# Patient Record
Sex: MALE | Race: BLACK OR AFRICAN AMERICAN | NOT HISPANIC OR LATINO | URBAN - METROPOLITAN AREA
[De-identification: names, ages, dates, MRNs, and addresses within clinical notes are randomized per-mention and may not be internally consistent; named-entity substitution may affect disease eponyms.]

---
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
## 2022-05-02 ENCOUNTER — HOSPITAL ENCOUNTER (EMERGENCY)
Facility: HOSPITAL | Age: 22
Discharge: HOME | End: 2022-05-02
Attending: EMERGENCY MEDICINE
Payer: MEDICAID

## 2022-05-02 ENCOUNTER — HOSPITAL ENCOUNTER (EMERGENCY)
Facility: HOSPITAL | Age: 22
Discharge: HOME | End: 2022-05-02
Attending: INTERNAL MEDICINE
Payer: MEDICAID

## 2022-05-02 VITALS
RESPIRATION RATE: 16 BRPM | WEIGHT: 300 LBS | SYSTOLIC BLOOD PRESSURE: 154 MMHG | TEMPERATURE: 100 F | HEART RATE: 108 BPM | HEIGHT: 69 IN | DIASTOLIC BLOOD PRESSURE: 66 MMHG | BODY MASS INDEX: 44.43 KG/M2 | OXYGEN SATURATION: 95 %

## 2022-05-02 VITALS
BODY MASS INDEX: 44.43 KG/M2 | RESPIRATION RATE: 18 BRPM | HEART RATE: 106 BPM | DIASTOLIC BLOOD PRESSURE: 73 MMHG | HEIGHT: 69 IN | TEMPERATURE: 98 F | SYSTOLIC BLOOD PRESSURE: 141 MMHG | WEIGHT: 300 LBS | OXYGEN SATURATION: 94 %

## 2022-05-02 DIAGNOSIS — R06.02 SHORTNESS OF BREATH: ICD-10-CM

## 2022-05-02 DIAGNOSIS — R56.9 SEIZURE: ICD-10-CM

## 2022-05-02 DIAGNOSIS — J18.9 PNEUMONIA OF RIGHT LOWER LOBE DUE TO INFECTIOUS ORGANISM: Primary | ICD-10-CM

## 2022-05-02 LAB
ALBUMIN SERPL BCP-MCNC: 4 G/DL (ref 3.5–5.2)
ALBUMIN SERPL BCP-MCNC: 4.1 G/DL (ref 3.5–5.2)
ALP SERPL-CCNC: 58 U/L (ref 55–135)
ALP SERPL-CCNC: 58 U/L (ref 55–135)
ALT SERPL W/O P-5'-P-CCNC: 21 U/L (ref 10–44)
ALT SERPL W/O P-5'-P-CCNC: 25 U/L (ref 10–44)
ANION GAP SERPL CALC-SCNC: 11 MMOL/L (ref 8–16)
ANION GAP SERPL CALC-SCNC: 9 MMOL/L (ref 8–16)
AST SERPL-CCNC: 27 U/L (ref 10–40)
AST SERPL-CCNC: 29 U/L (ref 10–40)
BACTERIA #/AREA URNS HPF: NORMAL /HPF
BASOPHILS # BLD AUTO: 0.02 K/UL (ref 0–0.2)
BASOPHILS # BLD AUTO: 0.03 K/UL (ref 0–0.2)
BASOPHILS NFR BLD: 0.2 % (ref 0–1.9)
BASOPHILS NFR BLD: 0.3 % (ref 0–1.9)
BILIRUB SERPL-MCNC: 0.5 MG/DL (ref 0.1–1)
BILIRUB SERPL-MCNC: 0.6 MG/DL (ref 0.1–1)
BILIRUB UR QL STRIP: NEGATIVE
BUN SERPL-MCNC: 8 MG/DL (ref 6–20)
BUN SERPL-MCNC: 8 MG/DL (ref 6–20)
CALCIUM SERPL-MCNC: 9 MG/DL (ref 8.7–10.5)
CALCIUM SERPL-MCNC: 9 MG/DL (ref 8.7–10.5)
CHLORIDE SERPL-SCNC: 103 MMOL/L (ref 95–110)
CHLORIDE SERPL-SCNC: 103 MMOL/L (ref 95–110)
CLARITY UR: CLEAR
CO2 SERPL-SCNC: 24 MMOL/L (ref 23–29)
CO2 SERPL-SCNC: 24 MMOL/L (ref 23–29)
COLOR UR: YELLOW
CREAT SERPL-MCNC: 0.9 MG/DL (ref 0.5–1.4)
CREAT SERPL-MCNC: 0.9 MG/DL (ref 0.5–1.4)
CTP QC/QA: YES
CTP QC/QA: YES
DIFFERENTIAL METHOD: ABNORMAL
DIFFERENTIAL METHOD: ABNORMAL
EOSINOPHIL # BLD AUTO: 0 K/UL (ref 0–0.5)
EOSINOPHIL # BLD AUTO: 0.1 K/UL (ref 0–0.5)
EOSINOPHIL NFR BLD: 0.3 % (ref 0–8)
EOSINOPHIL NFR BLD: 0.7 % (ref 0–8)
ERYTHROCYTE [DISTWIDTH] IN BLOOD BY AUTOMATED COUNT: 13.7 % (ref 11.5–14.5)
ERYTHROCYTE [DISTWIDTH] IN BLOOD BY AUTOMATED COUNT: 13.8 % (ref 11.5–14.5)
EST. GFR  (AFRICAN AMERICAN): >60 ML/MIN/1.73 M^2
EST. GFR  (AFRICAN AMERICAN): >60 ML/MIN/1.73 M^2
EST. GFR  (NON AFRICAN AMERICAN): >60 ML/MIN/1.73 M^2
EST. GFR  (NON AFRICAN AMERICAN): >60 ML/MIN/1.73 M^2
GLUCOSE SERPL-MCNC: 120 MG/DL (ref 70–110)
GLUCOSE SERPL-MCNC: 139 MG/DL (ref 70–110)
GLUCOSE UR QL STRIP: NEGATIVE
HCT VFR BLD AUTO: 42 % (ref 40–54)
HCT VFR BLD AUTO: 45.8 % (ref 40–54)
HGB BLD-MCNC: 13.6 G/DL (ref 14–18)
HGB BLD-MCNC: 14.2 G/DL (ref 14–18)
HGB UR QL STRIP: NEGATIVE
HYALINE CASTS #/AREA URNS LPF: 0 /LPF
IMM GRANULOCYTES # BLD AUTO: 0.02 K/UL (ref 0–0.04)
IMM GRANULOCYTES # BLD AUTO: 0.04 K/UL (ref 0–0.04)
IMM GRANULOCYTES NFR BLD AUTO: 0.2 % (ref 0–0.5)
IMM GRANULOCYTES NFR BLD AUTO: 0.3 % (ref 0–0.5)
KETONES UR QL STRIP: NEGATIVE
LACTATE SERPL-SCNC: 1.7 MMOL/L (ref 0.5–2.2)
LEUKOCYTE ESTERASE UR QL STRIP: NEGATIVE
LYMPHOCYTES # BLD AUTO: 0.8 K/UL (ref 1–4.8)
LYMPHOCYTES # BLD AUTO: 1.3 K/UL (ref 1–4.8)
LYMPHOCYTES NFR BLD: 12.9 % (ref 18–48)
LYMPHOCYTES NFR BLD: 6.3 % (ref 18–48)
MCH RBC QN AUTO: 26.5 PG (ref 27–31)
MCH RBC QN AUTO: 26.9 PG (ref 27–31)
MCHC RBC AUTO-ENTMCNC: 31 G/DL (ref 32–36)
MCHC RBC AUTO-ENTMCNC: 32.4 G/DL (ref 32–36)
MCV RBC AUTO: 83 FL (ref 82–98)
MCV RBC AUTO: 86 FL (ref 82–98)
MICROSCOPIC COMMENT: NORMAL
MONOCYTES # BLD AUTO: 0.4 K/UL (ref 0.3–1)
MONOCYTES # BLD AUTO: 0.5 K/UL (ref 0.3–1)
MONOCYTES NFR BLD: 3.7 % (ref 4–15)
MONOCYTES NFR BLD: 4.3 % (ref 4–15)
NEUTROPHILS # BLD AUTO: 10.5 K/UL (ref 1.8–7.7)
NEUTROPHILS # BLD AUTO: 8.6 K/UL (ref 1.8–7.7)
NEUTROPHILS NFR BLD: 82.2 % (ref 38–73)
NEUTROPHILS NFR BLD: 88.6 % (ref 38–73)
NITRITE UR QL STRIP: NEGATIVE
NRBC BLD-RTO: 0 /100 WBC
NRBC BLD-RTO: 0 /100 WBC
PH UR STRIP: 6 [PH] (ref 5–8)
PLATELET # BLD AUTO: 405 K/UL (ref 150–450)
PLATELET # BLD AUTO: 411 K/UL (ref 150–450)
PMV BLD AUTO: 9.6 FL (ref 9.2–12.9)
PMV BLD AUTO: 9.7 FL (ref 9.2–12.9)
POC MOLECULAR INFLUENZA A AGN: NEGATIVE
POC MOLECULAR INFLUENZA B AGN: NEGATIVE
POTASSIUM SERPL-SCNC: 4.2 MMOL/L (ref 3.5–5.1)
POTASSIUM SERPL-SCNC: 4.4 MMOL/L (ref 3.5–5.1)
PROT SERPL-MCNC: 7.7 G/DL (ref 6–8.4)
PROT SERPL-MCNC: 7.8 G/DL (ref 6–8.4)
PROT UR QL STRIP: ABNORMAL
RBC # BLD AUTO: 5.06 M/UL (ref 4.6–6.2)
RBC # BLD AUTO: 5.35 M/UL (ref 4.6–6.2)
RBC #/AREA URNS HPF: 0 /HPF (ref 0–4)
SARS-COV-2 RDRP RESP QL NAA+PROBE: NEGATIVE
SODIUM SERPL-SCNC: 136 MMOL/L (ref 136–145)
SODIUM SERPL-SCNC: 138 MMOL/L (ref 136–145)
SP GR UR STRIP: 1.02 (ref 1–1.03)
URN SPEC COLLECT METH UR: ABNORMAL
UROBILINOGEN UR STRIP-ACNC: NEGATIVE EU/DL
WBC # BLD AUTO: 10.39 K/UL (ref 3.9–12.7)
WBC # BLD AUTO: 11.82 K/UL (ref 3.9–12.7)
WBC #/AREA URNS HPF: 0 /HPF (ref 0–5)

## 2022-05-02 PROCEDURE — 25000003 PHARM REV CODE 250: Performed by: EMERGENCY MEDICINE

## 2022-05-02 PROCEDURE — 93005 ELECTROCARDIOGRAM TRACING: CPT

## 2022-05-02 PROCEDURE — 93010 ELECTROCARDIOGRAM REPORT: CPT | Mod: 59,76,, | Performed by: INTERNAL MEDICINE

## 2022-05-02 PROCEDURE — 99285 EMERGENCY DEPT VISIT HI MDM: CPT | Mod: 25,27

## 2022-05-02 PROCEDURE — 85025 COMPLETE CBC W/AUTO DIFF WBC: CPT | Performed by: INTERNAL MEDICINE

## 2022-05-02 PROCEDURE — 93010 ELECTROCARDIOGRAM REPORT: CPT | Mod: ,,, | Performed by: INTERNAL MEDICINE

## 2022-05-02 PROCEDURE — 93010 EKG 12-LEAD: ICD-10-PCS | Mod: ,,, | Performed by: INTERNAL MEDICINE

## 2022-05-02 PROCEDURE — 96360 HYDRATION IV INFUSION INIT: CPT

## 2022-05-02 PROCEDURE — 80053 COMPREHEN METABOLIC PANEL: CPT | Mod: 91 | Performed by: EMERGENCY MEDICINE

## 2022-05-02 PROCEDURE — 80177 DRUG SCRN QUAN LEVETIRACETAM: CPT | Performed by: EMERGENCY MEDICINE

## 2022-05-02 PROCEDURE — 25000003 PHARM REV CODE 250: Performed by: INTERNAL MEDICINE

## 2022-05-02 PROCEDURE — 80053 COMPREHEN METABOLIC PANEL: CPT | Performed by: INTERNAL MEDICINE

## 2022-05-02 PROCEDURE — 87502 INFLUENZA DNA AMP PROBE: CPT

## 2022-05-02 PROCEDURE — 87040 BLOOD CULTURE FOR BACTERIA: CPT | Performed by: INTERNAL MEDICINE

## 2022-05-02 PROCEDURE — 25000003 PHARM REV CODE 250

## 2022-05-02 PROCEDURE — 85025 COMPLETE CBC W/AUTO DIFF WBC: CPT | Mod: 91 | Performed by: EMERGENCY MEDICINE

## 2022-05-02 PROCEDURE — 99284 EMERGENCY DEPT VISIT MOD MDM: CPT | Mod: 25

## 2022-05-02 PROCEDURE — U0002 COVID-19 LAB TEST NON-CDC: HCPCS | Performed by: INTERNAL MEDICINE

## 2022-05-02 PROCEDURE — 93010 EKG 12-LEAD: ICD-10-PCS | Mod: 59,76,, | Performed by: INTERNAL MEDICINE

## 2022-05-02 PROCEDURE — 83605 ASSAY OF LACTIC ACID: CPT | Performed by: INTERNAL MEDICINE

## 2022-05-02 PROCEDURE — 81000 URINALYSIS NONAUTO W/SCOPE: CPT | Performed by: INTERNAL MEDICINE

## 2022-05-02 PROCEDURE — 63600175 PHARM REV CODE 636 W HCPCS: Performed by: INTERNAL MEDICINE

## 2022-05-02 RX ORDER — LEVOFLOXACIN 750 MG/1
750 TABLET ORAL DAILY
Status: DISCONTINUED | OUTPATIENT
Start: 2022-05-02 | End: 2022-05-02 | Stop reason: HOSPADM

## 2022-05-02 RX ORDER — ACETAMINOPHEN 500 MG
1000 TABLET ORAL
Status: COMPLETED | OUTPATIENT
Start: 2022-05-02 | End: 2022-05-02

## 2022-05-02 RX ORDER — LEVOFLOXACIN 750 MG/1
750 TABLET ORAL DAILY
Qty: 4 TABLET | Refills: 0 | Status: ON HOLD | OUTPATIENT
Start: 2022-05-03 | End: 2022-05-24 | Stop reason: HOSPADM

## 2022-05-02 RX ORDER — LEVETIRACETAM 500 MG/1
2000 TABLET ORAL ONCE
Status: COMPLETED | OUTPATIENT
Start: 2022-05-02 | End: 2022-05-02

## 2022-05-02 RX ORDER — IBUPROFEN 400 MG/1
800 TABLET ORAL
Status: COMPLETED | OUTPATIENT
Start: 2022-05-02 | End: 2022-05-02

## 2022-05-02 RX ORDER — LEVOFLOXACIN 750 MG/1
750 TABLET ORAL DAILY
Status: DISCONTINUED | OUTPATIENT
Start: 2022-05-02 | End: 2022-05-02

## 2022-05-02 RX ADMIN — ACETAMINOPHEN 1000 MG: 500 TABLET ORAL at 05:05

## 2022-05-02 RX ADMIN — LEVETIRACETAM 2000 MG: 500 TABLET, FILM COATED ORAL at 09:05

## 2022-05-02 RX ADMIN — SODIUM CHLORIDE 1000 ML: 0.9 INJECTION, SOLUTION INTRAVENOUS at 09:05

## 2022-05-02 RX ADMIN — IBUPROFEN 800 MG: 400 TABLET ORAL at 05:05

## 2022-05-02 RX ADMIN — SODIUM CHLORIDE, SODIUM LACTATE, POTASSIUM CHLORIDE, AND CALCIUM CHLORIDE 1000 ML: .6; .31; .03; .02 INJECTION, SOLUTION INTRAVENOUS at 05:05

## 2022-05-02 RX ADMIN — LEVOFLOXACIN 750 MG: 750 TABLET, FILM COATED ORAL at 07:05

## 2022-05-02 NOTE — DISCHARGE INSTRUCTIONS
Thank you for coming to our Emergency Department today. It is important to remember that some problems are difficult to diagnose and may not be found during your first visit. Be sure to follow up with your primary care doctor and review any labs/imaging that was performed with them. If you do not have a primary care doctor, you may contact the one listed on your discharge paperwork or you may also call the [facility] Appointment Desk at [phone] to schedule an appointment with one.     All medications may potentially have side effects and it is impossible to predict which medications may give you side effects. If you feel that you are having a negative effect of any medication you should immediately stop taking them and seek medical attention.    Return to the ER with any questions/concerns, new/concerning symptoms, worsening or failure to improve. Do not drive or make any important decisions for 24 hours if you have received any pain medications, sedatives or mood altering drugs during your ER visit.

## 2022-05-02 NOTE — ED PROVIDER NOTES
Encounter Date: 5/2/2022       History     Chief Complaint   Patient presents with    Shortness of Breath     Pt. Arrived via EMS with 1 day Hx of SOB + cold and congestion,  pt. Is on 6lpm via N/C Spo2 98% initial R/A 84% -per EMS, Pt. Is speaking in complete sentences, denies N/V/D, oral temp 103.0.      22-year-old male presents to the emergency department complaining of shortness of breath, cold symptoms x1 day.  He states his grandmother has recently been ill with a respiratory virus and believes he may have caught it from her.  Denies chest pain/nausea/vomiting.    The history is provided by the patient. No  was used.     Review of patient's allergies indicates:   Allergen Reactions    Amoxicillin      Past Medical History:   Diagnosis Date    Asthma     Diabetes mellitus     Hypertension     Seizures      No past surgical history on file.  No family history on file.  Social History     Tobacco Use    Smoking status: Never Smoker    Smokeless tobacco: Never Used   Substance Use Topics    Alcohol use: No    Drug use: No     Review of Systems   Constitutional: Positive for fever.   HENT: Positive for congestion, postnasal drip and rhinorrhea.    Respiratory: Positive for cough and shortness of breath. Negative for wheezing.    Cardiovascular: Negative for chest pain, palpitations and leg swelling.   Gastrointestinal: Negative for nausea and vomiting.   All other systems reviewed and are negative.      Physical Exam     Initial Vitals [05/02/22 0505]   BP Pulse Resp Temp SpO2   (!) 169/87 (!) 123 (!) 22 (!) 103 °F (39.4 °C) 98 %      MAP       --         Physical Exam    Nursing note and vitals reviewed.  Constitutional: He is not diaphoretic. No distress.   HENT:   Head: Normocephalic and atraumatic.   Right Ear: External ear normal.   Left Ear: External ear normal.   Clear nasal discharge, clear postnasal drip, posterior oropharyngeal erythema without exudate or edema   Eyes:  Conjunctivae and EOM are normal. Pupils are equal, round, and reactive to light.   Neck: Neck supple.   Normal range of motion.  Cardiovascular: Normal rate, regular rhythm and intact distal pulses.   Pulmonary/Chest: Breath sounds normal. No respiratory distress. He has no wheezes.   Abdominal: Abdomen is soft. Bowel sounds are normal. He exhibits no distension.   Musculoskeletal:         General: Normal range of motion.      Cervical back: Normal range of motion and neck supple.     Neurological: He is alert and oriented to person, place, and time. He has normal strength.   Skin: Skin is warm and dry. Capillary refill takes less than 2 seconds.   Psychiatric: He has a normal mood and affect. Thought content normal.         ED Course   Procedures  Labs Reviewed   CBC W/ AUTO DIFFERENTIAL - Abnormal; Notable for the following components:       Result Value    MCH 26.5 (*)     MCHC 31.0 (*)     Gran # (ANC) 8.6 (*)     Gran % 82.2 (*)     Lymph % 12.9 (*)     Mono % 3.7 (*)     All other components within normal limits   COMPREHENSIVE METABOLIC PANEL - Abnormal; Notable for the following components:    Glucose 139 (*)     All other components within normal limits   URINALYSIS, REFLEX TO URINE CULTURE - Abnormal; Notable for the following components:    Protein, UA 1+ (*)     All other components within normal limits    Narrative:     Specimen Source->Urine   POCT INFLUENZA A/B MOLECULAR - Normal   CULTURE, BLOOD   CULTURE, BLOOD   LACTIC ACID, PLASMA   URINALYSIS MICROSCOPIC    Narrative:     Specimen Source->Urine   SARS-COV-2 RDRP GENE          Imaging Results          X-Ray Chest AP Portable (Final result)  Result time 05/02/22 06:19:49    Final result by Matteo King MD (05/02/22 06:19:49)                 Impression:      Consolidative change RIGHT lung base, atelectasis versus pneumonia favored.      Electronically signed by: Matteo King MD  Date:    05/02/2022  Time:    06:19             Narrative:     EXAMINATION:  XR CHEST AP PORTABLE    CLINICAL HISTORY:  Sepsis;    TECHNIQUE:  Single frontal view of the chest was performed.    COMPARISON:  None    FINDINGS:  Consolidative change at level of.  Atelectasis versus pneumonia would be leading considerations.  LEFT lung is clear.  The RIGHT lung base no pleural effusion. No evident pneumothorax.    The cardiac silhouette is normal in size. The hilar and mediastinal contours are unremarkable.    Bones are intact.                                 Medications   levoFLOXacin tablet 750 mg (750 mg Oral Given 5/2/22 0702)   lactated ringers bolus 4,083 mL (0 mLs Intravenous Stopped 5/2/22 0626)   acetaminophen tablet 1,000 mg (1,000 mg Oral Given 5/2/22 0520)   ibuprofen tablet 800 mg (800 mg Oral Given 5/2/22 0521)     Medical Decision Making:   Initial Assessment:   22-year-old male presents to the emergency department complaining of shortness of breath, cold symptoms x1 day.  He states his grandmother has recently been ill with a respiratory virus and believes he may have caught it from her.  Denies chest pain/nausea/vomiting.  ED Management:  CBC revealed normal white blood cell count, serum lactate was within normal limits and chest x-ray showed likely right lower lobe pneumonia.  Levaquin was given in the ED as well as a prescription for Levaquin.  Patient was able to maintain O2 sats of 95% or greater upon ambulation.  Instructions for pneumonia were given and the patient was advised follow-up with primary care physician within the next 3 days for re-evaluation/return to the emergency department if condition worsens.                      Clinical Impression:   Final diagnoses:  [R06.02] Shortness of breath  [J18.9] Pneumonia of right lower lobe due to infectious organism (Primary)                 Abe Vera MD  05/02/22 0708

## 2022-05-02 NOTE — ED NOTES
Adult Physical Assessment  LOC: Luis Fernando Chamberlain, 22 y.o. male verified via two identifiers.  The patient is awake, alert, oriented and speaking appropriately at this time.  APPEARANCE: Patient resting comfortably and appears to be in no acute distress at this time. Patient is clean and well groomed, patient's clothing is properly fastened.  SKIN:The skin is warm and dry, color consistent with ethnicity, patient has normal skin turgor and moist mucus membranes, skin intact, no breakdown or brusing noted.  MUSCULOSKELETAL: Patient moving all extremities well, no obvious swelling or deformities noted.  RESPIRATORY: Airway is open and patent, respirations are spontaneous, patient has a normal effort and rate, no accessory muscle use noted.  CARDIAC: Patient has a normal rate and rhythm, no periphreal edema noted in any extremity, capillary refill < 3 seconds in all extremities  ABDOMEN: Soft and non tender to palpation, no abdominal distention noted. Bowel sounds present in all four quadrants.  NEUROLOGIC: Eyes open spontaneously, behavior appropriate to situation, follows commands, facial expression symmetrical, bilateral hand grasp equal and even, purposeful motor response noted, normal sensation in all extremities when touched with a finger.

## 2022-05-02 NOTE — ED PROVIDER NOTES
Encounter Date: 5/2/2022       History     Chief Complaint   Patient presents with    Seizures     Pt arrived ems, pt chief complaint was seizure, pt was discharged from faciltiy and on the way home had a witnessed seizure.      22-year-old male with a past medical history of diabetes, hypertension, asthma, and seizures presents to the ED after a seizure episode.  Patient states he was just here in the ED where he got diagnosed with pneumonia and he was on his way home when the seizure happened.  Patient does not know how long the seizure lasted.  He denies biting his tongue or an episode of incontinence.  He states he did not get to take his home dose of Keppra.  He does state his left shoulder is a little sore after the seizure.  He denies any other pain.  He denies chest pain, nausea, vomiting, abdominal pain, and headache.  He does admit to feeling slightly short of breath.         Review of patient's allergies indicates:   Allergen Reactions    Amoxicillin      Past Medical History:   Diagnosis Date    Asthma     Diabetes mellitus     Hypertension     Seizures      No past surgical history on file.  No family history on file.  Social History     Tobacco Use    Smoking status: Never Smoker    Smokeless tobacco: Never Used   Substance Use Topics    Alcohol use: No    Drug use: No     Review of Systems   Constitutional: Negative for chills, diaphoresis and fever.   Eyes: Negative for visual disturbance.   Respiratory: Positive for shortness of breath.    Cardiovascular: Negative for chest pain.   Gastrointestinal: Negative for abdominal pain, nausea and vomiting.   Musculoskeletal: Positive for arthralgias (Left shoulder).   Neurological: Positive for seizures. Negative for syncope, speech difficulty and headaches.       Physical Exam     Initial Vitals [05/02/22 0837]   BP Pulse Resp Temp SpO2   138/64 (!) 135 16 98.4 °F (36.9 °C) 95 %      MAP       --         Physical Exam    Nursing note and vitals  reviewed.  Constitutional: He appears well-developed and well-nourished. He is not diaphoretic. He is active. No distress.   HENT:   Head: Normocephalic and atraumatic.   Right Ear: External ear normal.   Left Ear: External ear normal.   Nose: Nose normal.   Eyes: Conjunctivae, EOM and lids are normal. Pupils are equal, round, and reactive to light. Right eye exhibits no discharge. Left eye exhibits no discharge. No scleral icterus.   Neck:   Normal range of motion.  Cardiovascular: Normal rate and regular rhythm.   Pulmonary/Chest: Effort normal and breath sounds normal. No respiratory distress.   Abdominal: Abdomen is soft. He exhibits no distension.   Musculoskeletal:         General: Normal range of motion.      Right shoulder: Normal.      Left shoulder: No swelling, deformity, tenderness or bony tenderness. Normal range of motion.      Cervical back: Normal range of motion.     Neurological: He is alert and oriented to person, place, and time.   Skin: Skin is dry. Capillary refill takes less than 2 seconds.         ED Course   Procedures  Labs Reviewed   CBC W/ AUTO DIFFERENTIAL - Abnormal; Notable for the following components:       Result Value    Hemoglobin 13.6 (*)     MCH 26.9 (*)     Gran # (ANC) 10.5 (*)     Lymph # 0.8 (*)     Gran % 88.6 (*)     Lymph % 6.3 (*)     All other components within normal limits   COMPREHENSIVE METABOLIC PANEL - Abnormal; Notable for the following components:    Glucose 120 (*)     All other components within normal limits   LEVETIRACETAM  (KEPPRA) LEVEL          Imaging Results    None          Medications   levETIRAcetam tablet 2,000 mg (2,000 mg Oral Given 5/2/22 0898)   sodium chloride 0.9% bolus 1,000 mL (1,000 mLs Intravenous New Bag 5/2/22 9381)     Medical Decision Making:   Initial Assessment:   22-year-old male with a past medical history of diabetes, hypertension, asthma, and seizures presents to the ED after a seizure episode.    Patient's chart and medical  history reviewed.  Differential Diagnosis:   Seizure   Hypoglycemia  Medication noncompliance   ED Management:  Patient's vitals were reviewed.  He was given a L of fluids for his tachycardia, and his home dose of Keppra.  CBC and CMP had no significant changes since the since this morning where he was diagnosed with pneumonia.  His blood sugar was 120. His Keppra level is still pending.  Patient's tachycardia was resolved with a 1 L fluids.  He states he is feeling well and stable for discharge.  Discussed with patient to continue taking antibiotics for his pneumonia and to be sure not to miss another dose of his Keppra, since this is most likely due to his missed dose.  Patient verbalized understanding.  He is stable for discharge.                      Clinical Impression:   Final diagnoses:  [R56.9] Seizure          ED Disposition Condition    Discharge Stable        ED Prescriptions     None        Follow-up Information     Follow up With Specialties Details Why Contact Info    Tom Moore Jr., MD Family Medicine   [address] [place] [id number]  [phone]             Alayna Holdsworth, PA-C  05/02/22 9208

## 2022-05-05 LAB — LEVETIRACETAM SERPL-MCNC: 20.2 UG/ML (ref 3–60)

## 2022-05-06 LAB
BACTERIA BLD CULT: NORMAL
BACTERIA BLD CULT: NORMAL

## 2022-05-23 ENCOUNTER — HOSPITAL ENCOUNTER (OUTPATIENT)
Facility: HOSPITAL | Age: 22
Discharge: HOME | End: 2022-05-24
Attending: EMERGENCY MEDICINE | Admitting: HOSPITALIST
Payer: MEDICAID

## 2022-05-23 DIAGNOSIS — R09.02 HYPOXIA: ICD-10-CM

## 2022-05-23 DIAGNOSIS — G40.909 SEIZURE DISORDER: ICD-10-CM

## 2022-05-23 DIAGNOSIS — U07.1 COVID-19: Primary | ICD-10-CM

## 2022-05-23 DIAGNOSIS — R00.0 TACHYCARDIA: ICD-10-CM

## 2022-05-23 PROBLEM — E66.01 SEVERE OBESITY (BMI >= 40): Status: ACTIVE | Noted: 2022-05-23

## 2022-05-23 PROBLEM — E11.9 TYPE 2 DIABETES MELLITUS TREATED WITHOUT INSULIN: Status: ACTIVE | Noted: 2022-05-23

## 2022-05-23 PROBLEM — I10 HTN (HYPERTENSION): Status: ACTIVE | Noted: 2022-05-23

## 2022-05-23 PROBLEM — J12.82 PNEUMONIA DUE TO COVID-19 VIRUS: Status: ACTIVE | Noted: 2022-05-23

## 2022-05-23 PROBLEM — A41.89 SEPSIS DUE TO COVID-19: Status: ACTIVE | Noted: 2022-05-23

## 2022-05-23 LAB
ALBUMIN SERPL-MCNC: 3.9 G/DL (ref 3.3–5.5)
ALLENS TEST: ABNORMAL
ALP SERPL-CCNC: 71 U/L (ref 42–141)
APTT BLDCRRT: 31.1 SEC (ref 21–32)
BILIRUB SERPL-MCNC: 0.7 MG/DL (ref 0.2–1.6)
BILIRUBIN, POC UA: NEGATIVE
BLOOD, POC UA: ABNORMAL
BNP SERPL-MCNC: <10 PG/ML (ref 0–99)
BUN SERPL-MCNC: 9 MG/DL (ref 7–22)
CALCIUM SERPL-MCNC: 9.6 MG/DL (ref 8–10.3)
CHLORIDE SERPL-SCNC: 105 MMOL/L (ref 98–108)
CK SERPL-CCNC: 153 U/L (ref 20–200)
CLARITY, POC UA: CLEAR
COLOR, POC UA: YELLOW
CREAT SERPL-MCNC: 1 MG/DL (ref 0.6–1.2)
CTP QC/QA: YES
ERYTHROCYTE [SEDIMENTATION RATE] IN BLOOD BY WESTERGREN METHOD: 23 MM/HR (ref 0–10)
FERRITIN SERPL-MCNC: 170 NG/ML (ref 20–300)
GLUCOSE SERPL-MCNC: 132 MG/DL (ref 73–118)
GLUCOSE, POC UA: NEGATIVE
HCO3 UR-SCNC: 27.1 MMOL/L (ref 24–28)
INFLUENZA A ANTIGEN, POC: NEGATIVE
INFLUENZA B ANTIGEN, POC: NEGATIVE
INR PPP: 1.1 (ref 0.8–1.2)
KETONES, POC UA: NEGATIVE
LACTATE SERPL-SCNC: 0.8 MMOL/L (ref 0.5–2.2)
LDH SERPL L TO P-CCNC: 1.5 MMOL/L (ref 0.5–2.2)
LDH SERPL L TO P-CCNC: 172 U/L (ref 110–260)
LEUKOCYTE EST, POC UA: NEGATIVE
NITRITE, POC UA: NEGATIVE
PCO2 BLDA: 43.8 MMHG (ref 35–45)
PH SMN: 7.4 [PH] (ref 7.35–7.45)
PH UR STRIP: 5.5 [PH]
PO2 BLDA: 68 MMHG (ref 40–60)
POC ALT (SGPT): 18 U/L (ref 10–47)
POC AST (SGOT): 24 U/L (ref 11–38)
POC BE: 2 MMOL/L
POC SATURATED O2: 93 % (ref 95–100)
POC TCO2: 28 MMOL/L (ref 24–29)
POC TCO2: 29 MMOL/L (ref 18–33)
POCT GLUCOSE: 171 MG/DL (ref 70–110)
POCT GLUCOSE: 173 MG/DL (ref 70–110)
POTASSIUM BLD-SCNC: 4.5 MMOL/L (ref 3.6–5.1)
PROTEIN, POC UA: ABNORMAL
PROTEIN, POC: 8.1 G/DL (ref 6.4–8.1)
PROTHROMBIN TIME: 11.4 SEC (ref 9–12.5)
SAMPLE: ABNORMAL
SARS-COV-2 RDRP RESP QL NAA+PROBE: POSITIVE
SITE: ABNORMAL
SODIUM BLD-SCNC: 137 MMOL/L (ref 128–145)
SPECIFIC GRAVITY, POC UA: 1.02
TROPONIN I SERPL DL<=0.01 NG/ML-MCNC: <0.006 NG/ML (ref 0–0.03)
UROBILINOGEN, POC UA: 0.2 E.U./DL

## 2022-05-23 PROCEDURE — 85730 THROMBOPLASTIN TIME PARTIAL: CPT | Performed by: PHYSICIAN ASSISTANT

## 2022-05-23 PROCEDURE — 36415 COLL VENOUS BLD VENIPUNCTURE: CPT | Performed by: PHYSICIAN ASSISTANT

## 2022-05-23 PROCEDURE — 63600175 PHARM REV CODE 636 W HCPCS: Mod: ER | Performed by: NURSE PRACTITIONER

## 2022-05-23 PROCEDURE — 25000003 PHARM REV CODE 250: Performed by: PHYSICIAN ASSISTANT

## 2022-05-23 PROCEDURE — 96374 THER/PROPH/DIAG INJ IV PUSH: CPT | Mod: 59,ER

## 2022-05-23 PROCEDURE — G0378 HOSPITAL OBSERVATION PER HR: HCPCS

## 2022-05-23 PROCEDURE — 83615 LACTATE (LD) (LDH) ENZYME: CPT | Performed by: PHYSICIAN ASSISTANT

## 2022-05-23 PROCEDURE — 25000003 PHARM REV CODE 250: Mod: ER | Performed by: PHYSICIAN ASSISTANT

## 2022-05-23 PROCEDURE — 87040 BLOOD CULTURE FOR BACTERIA: CPT | Performed by: PHYSICIAN ASSISTANT

## 2022-05-23 PROCEDURE — 85652 RBC SED RATE AUTOMATED: CPT | Performed by: PHYSICIAN ASSISTANT

## 2022-05-23 PROCEDURE — 85025 COMPLETE CBC W/AUTO DIFF WBC: CPT | Mod: ER

## 2022-05-23 PROCEDURE — 25000003 PHARM REV CODE 250: Mod: ER | Performed by: EMERGENCY MEDICINE

## 2022-05-23 PROCEDURE — 82550 ASSAY OF CK (CPK): CPT | Performed by: PHYSICIAN ASSISTANT

## 2022-05-23 PROCEDURE — 63600175 PHARM REV CODE 636 W HCPCS: Mod: TB | Performed by: PHYSICIAN ASSISTANT

## 2022-05-23 PROCEDURE — 96372 THER/PROPH/DIAG INJ SC/IM: CPT | Performed by: PHYSICIAN ASSISTANT

## 2022-05-23 PROCEDURE — 96361 HYDRATE IV INFUSION ADD-ON: CPT | Mod: ER

## 2022-05-23 PROCEDURE — U0002 COVID-19 LAB TEST NON-CDC: HCPCS | Mod: ER | Performed by: PHYSICIAN ASSISTANT

## 2022-05-23 PROCEDURE — 93010 ELECTROCARDIOGRAM REPORT: CPT | Mod: ,,, | Performed by: INTERNAL MEDICINE

## 2022-05-23 PROCEDURE — 93005 ELECTROCARDIOGRAM TRACING: CPT | Mod: ER

## 2022-05-23 PROCEDURE — 80053 COMPREHEN METABOLIC PANEL: CPT | Mod: ER

## 2022-05-23 PROCEDURE — 82728 ASSAY OF FERRITIN: CPT | Performed by: PHYSICIAN ASSISTANT

## 2022-05-23 PROCEDURE — 83605 ASSAY OF LACTIC ACID: CPT | Performed by: PHYSICIAN ASSISTANT

## 2022-05-23 PROCEDURE — 99285 EMERGENCY DEPT VISIT HI MDM: CPT | Mod: 25,ER

## 2022-05-23 PROCEDURE — 81003 URINALYSIS AUTO W/O SCOPE: CPT | Mod: ER

## 2022-05-23 PROCEDURE — 25000242 PHARM REV CODE 250 ALT 637 W/ HCPCS: Performed by: PHYSICIAN ASSISTANT

## 2022-05-23 PROCEDURE — 83880 ASSAY OF NATRIURETIC PEPTIDE: CPT | Performed by: PHYSICIAN ASSISTANT

## 2022-05-23 PROCEDURE — 85610 PROTHROMBIN TIME: CPT | Performed by: PHYSICIAN ASSISTANT

## 2022-05-23 PROCEDURE — 96365 THER/PROPH/DIAG IV INF INIT: CPT

## 2022-05-23 PROCEDURE — 84484 ASSAY OF TROPONIN QUANT: CPT | Performed by: PHYSICIAN ASSISTANT

## 2022-05-23 PROCEDURE — 94640 AIRWAY INHALATION TREATMENT: CPT

## 2022-05-23 PROCEDURE — 82803 BLOOD GASES ANY COMBINATION: CPT | Mod: ER

## 2022-05-23 PROCEDURE — 93010 EKG 12-LEAD: ICD-10-PCS | Mod: ,,, | Performed by: INTERNAL MEDICINE

## 2022-05-23 PROCEDURE — 94760 N-INVAS EAR/PLS OXIMETRY 1: CPT

## 2022-05-23 RX ORDER — ASCORBIC ACID 500 MG
500 TABLET ORAL 2 TIMES DAILY
Status: DISCONTINUED | OUTPATIENT
Start: 2022-05-23 | End: 2022-05-24 | Stop reason: HOSPADM

## 2022-05-23 RX ORDER — BENZONATATE 100 MG/1
100 CAPSULE ORAL 3 TIMES DAILY PRN
Status: DISCONTINUED | OUTPATIENT
Start: 2022-05-23 | End: 2022-05-24 | Stop reason: HOSPADM

## 2022-05-23 RX ORDER — AMOXICILLIN 250 MG
1 CAPSULE ORAL DAILY PRN
Status: DISCONTINUED | OUTPATIENT
Start: 2022-05-23 | End: 2022-05-24 | Stop reason: HOSPADM

## 2022-05-23 RX ORDER — LEVETIRACETAM 500 MG/1
2000 TABLET ORAL 2 TIMES DAILY
Status: DISCONTINUED | OUTPATIENT
Start: 2022-05-23 | End: 2022-05-24 | Stop reason: HOSPADM

## 2022-05-23 RX ORDER — VALSARTAN 80 MG/1
80 TABLET ORAL DAILY
COMMUNITY

## 2022-05-23 RX ORDER — INSULIN ASPART 100 [IU]/ML
0-5 INJECTION, SOLUTION INTRAVENOUS; SUBCUTANEOUS
Status: DISCONTINUED | OUTPATIENT
Start: 2022-05-23 | End: 2022-05-24 | Stop reason: HOSPADM

## 2022-05-23 RX ORDER — ALBUTEROL SULFATE 90 UG/1
2 AEROSOL, METERED RESPIRATORY (INHALATION) EVERY 6 HOURS
Status: DISCONTINUED | OUTPATIENT
Start: 2022-05-23 | End: 2022-05-23

## 2022-05-23 RX ORDER — ACETAMINOPHEN 500 MG
500 TABLET ORAL EVERY 6 HOURS PRN
Status: DISCONTINUED | OUTPATIENT
Start: 2022-05-23 | End: 2022-05-24 | Stop reason: HOSPADM

## 2022-05-23 RX ORDER — TALC
6 POWDER (GRAM) TOPICAL NIGHTLY PRN
Status: DISCONTINUED | OUTPATIENT
Start: 2022-05-23 | End: 2022-05-24 | Stop reason: HOSPADM

## 2022-05-23 RX ORDER — ACETAMINOPHEN 500 MG
1000 TABLET ORAL
Status: COMPLETED | OUTPATIENT
Start: 2022-05-23 | End: 2022-05-23

## 2022-05-23 RX ORDER — ENOXAPARIN SODIUM 150 MG/ML
1 INJECTION SUBCUTANEOUS 2 TIMES DAILY
Status: DISCONTINUED | OUTPATIENT
Start: 2022-05-23 | End: 2022-05-24 | Stop reason: HOSPADM

## 2022-05-23 RX ORDER — DEXAMETHASONE SODIUM PHOSPHATE 4 MG/ML
8 INJECTION, SOLUTION INTRA-ARTICULAR; INTRALESIONAL; INTRAMUSCULAR; INTRAVENOUS; SOFT TISSUE
Status: COMPLETED | OUTPATIENT
Start: 2022-05-23 | End: 2022-05-23

## 2022-05-23 RX ORDER — ALBUTEROL SULFATE 90 UG/1
2 AEROSOL, METERED RESPIRATORY (INHALATION) 2 TIMES DAILY
Status: DISCONTINUED | OUTPATIENT
Start: 2022-05-23 | End: 2022-05-24 | Stop reason: HOSPADM

## 2022-05-23 RX ORDER — SODIUM CHLORIDE 0.9 % (FLUSH) 0.9 %
10 SYRINGE (ML) INJECTION
Status: DISCONTINUED | OUTPATIENT
Start: 2022-05-23 | End: 2022-05-24 | Stop reason: HOSPADM

## 2022-05-23 RX ADMIN — ALBUTEROL SULFATE 2 PUFF: 108 INHALANT RESPIRATORY (INHALATION) at 08:05

## 2022-05-23 RX ADMIN — SODIUM CHLORIDE 1000 ML: 0.9 INJECTION, SOLUTION INTRAVENOUS at 11:05

## 2022-05-23 RX ADMIN — ENOXAPARIN SODIUM 135 MG: 150 INJECTION SUBCUTANEOUS at 08:05

## 2022-05-23 RX ADMIN — LEVETIRACETAM 2000 MG: 500 TABLET, FILM COATED ORAL at 03:05

## 2022-05-23 RX ADMIN — BENZONATATE 100 MG: 100 CAPSULE ORAL at 08:05

## 2022-05-23 RX ADMIN — REMDESIVIR 200 MG: 100 INJECTION, POWDER, LYOPHILIZED, FOR SOLUTION INTRAVENOUS at 04:05

## 2022-05-23 RX ADMIN — OXYCODONE HYDROCHLORIDE AND ACETAMINOPHEN 500 MG: 500 TABLET ORAL at 08:05

## 2022-05-23 RX ADMIN — ACETAMINOPHEN 1000 MG: 500 TABLET ORAL at 10:05

## 2022-05-23 RX ADMIN — DEXAMETHASONE SODIUM PHOSPHATE 8 MG: 4 INJECTION INTRA-ARTICULAR; INTRALESIONAL; INTRAMUSCULAR; INTRAVENOUS; SOFT TISSUE at 12:05

## 2022-05-23 NOTE — H&P
[facility]  History & Physical    Patient Name: Luis Fernando Chamberlain  MRN: [id number]  Patient Class: OP- Observation  Admission Date: 5/23/2022  Attending Physician: Rosalee Hitchcock MD   Primary Care Provider: Tom Moore Jr, MD         Patient information was obtained from patient, past medical records and ER records.     Subjective:     Principal Problem:Pneumonia due to COVID-19 virus    Chief Complaint:   Chief Complaint   Patient presents with    Pneumonia     Pt was dx with pneumonia on 5/4. And given rx for levaquin. Pt had seizure on the way home after being dx. Pt followed up and was told he hadnt been given enough days of abx and gave more levaquin. Pt states he took 8 days of levaquin. Pt still had symptoms on 5/20 and had another seizure. Pt still has the cough and states the tesstiffany de leones arent helping with the cough at all.         HPI: Luis Fernando Chamberlain 22 y.o. male with obseity, HTN, seizures, DM presents to the hospital with a chpresents also with chief complaint of fever.  Reports on May 4th was diagnosed with pneumonia in the emergency room and treated with Levaquin.  Reports initial improvement in symptoms, but then yesterday he began to develop severe fever and chills with associated non-productive cough. He finds his SOB is worsened with and improvexd with rest. He has not attempted any treatment at home.  He has not been hospitalized or traveling.  He denies chest pain nausea vomiting syncope, hematuria, hematemesis, melena.    In the ED, febrile tachycardic, O2 sats 92% on RA with ambulation, chest x-ray with new airspace opacity in the RUL resolution of the previous right lung base infiltrate, flu negative.       Past Medical History:   Diagnosis Date    Asthma     Diabetes mellitus     Hypertension     Seizures        History reviewed. No pertinent surgical history.    Review of patient's allergies indicates:   Allergen Reactions    Amoxicillin         No current facility-administered medications on file prior to encounter.     Current Outpatient Medications on File Prior to Encounter   Medication Sig    albuterol (VENTOLIN HFA) 90 mcg/actuation inhaler Inhale 2 puffs into the lungs every 6 (six) hours as needed for Wheezing. Rescue    levETIRAcetam (KEPPRA) 1000 MG tablet Take 1 tablet (1,000 mg total) by mouth 2 (two) times daily.    levoFLOXacin (LEVAQUIN) 750 MG tablet Take 1 tablet (750 mg total) by mouth once daily.    metFORMIN (GLUCOPHAGE) 1000 MG tablet Take 1,000 mg by mouth 2 (two) times daily with meals.    [DISCONTINUED] amLODIPine (NORVASC) 2.5 MG tablet Take 2.5 mg by mouth once daily.     Family History    Mother-HTN       Tobacco Use    Smoking status: Never Smoker    Smokeless tobacco: Never Used   Substance and Sexual Activity    Alcohol use: No    Drug use: No    Sexual activity: Never     Review of Systems   Constitutional:  Positive for chills and fever.   HENT:  Negative for nosebleeds and tinnitus.    Eyes:  Negative for photophobia and visual disturbance.   Respiratory:  Positive for cough and shortness of breath. Negative for wheezing.    Cardiovascular:  Negative for chest pain, palpitations and leg swelling.   Gastrointestinal:  Negative for abdominal distention, nausea and vomiting.   Genitourinary:  Negative for dysuria, flank pain and hematuria.   Musculoskeletal:  Negative for gait problem and joint swelling.   Skin:  Negative for rash and wound.   Neurological:  Negative for seizures and syncope.   Objective:     Vital Signs (Most Recent):  Temp: 99.1 °F (37.3 °C) (05/23/22 1346)  Pulse: 93 (05/23/22 1346)  Resp: 16 (05/23/22 1346)  BP: 134/83 (05/23/22 1346)  SpO2: 98 % (05/23/22 1346) Vital Signs (24h Range):  Temp:  [99.1 °F (37.3 °C)-102.9 °F (39.4 °C)] 99.1 °F (37.3 °C)  Pulse:  [] 93  Resp:  [15-23] 16  SpO2:  [92 %-98 %] 98 %  BP: (134-168)/(81-95) 134/83     Weight: 133.8 kg (295 lb)  Body mass index  is 40.01 kg/m².    Physical Exam  Vitals and nursing note reviewed.   Constitutional:       General: He is not in acute distress.     Appearance: He is well-developed. He is not diaphoretic.   HENT:      Head: Normocephalic and atraumatic.      Right Ear: External ear normal.      Left Ear: External ear normal.   Eyes:      General:         Right eye: No discharge.         Left eye: No discharge.      Conjunctiva/sclera: Conjunctivae normal.   Neck:      Thyroid: No thyromegaly.   Cardiovascular:      Rate and Rhythm: Normal rate and regular rhythm.      Heart sounds: No murmur heard.  Pulmonary:      Effort: Pulmonary effort is normal. No respiratory distress.      Breath sounds: Normal breath sounds.      Comments: On RA speaking in full sentences  Abdominal:      General: Bowel sounds are normal. There is no distension.      Palpations: Abdomen is soft. There is no mass.      Tenderness: There is no abdominal tenderness.   Musculoskeletal:         General: No deformity.      Cervical back: Normal range of motion and neck supple.      Right lower leg: No edema.      Left lower leg: No edema.   Skin:     General: Skin is warm and dry.   Neurological:      Mental Status: He is alert and oriented to person, place, and time.      Sensory: No sensory deficit.   Psychiatric:         Mood and Affect: Mood normal.         Behavior: Behavior normal.           Significant Labs:   CBC  WBC: 8.0  H&H: 13&41  PLTs: 418    CMP  Sodium: 137  Potassium: 4.5  Chloride: 105  BUN: 9  Cr: 1.0  Glucose: 132  Alk phos: 71  Albumin: 3.9  AST/ALT: 24/18  Lactic acid 1.5  COVID positive  Flu A/B negative  Calcium: 9.6    Significant Imaging:   Imaging Results              X-Ray Chest AP Portable (Final result)  Result time 05/23/22 12:08:18      Final result by Ferny Daly MD (05/23/22 12:08:18)                   Impression:      New airspace opacity in the right upper lung zone.    Resolution of previous airspace opacity in the right  lung base.      Electronically signed by: Ferny Daly MD  Date:    05/23/2022  Time:    12:08               Narrative:    EXAMINATION:  XR CHEST AP PORTABLE    CLINICAL HISTORY:  fever;    TECHNIQUE:  Single frontal view of the chest was performed.    COMPARISON:  05/02/2022.    FINDINGS:  Monitoring EKG leads are present.  The trachea is unremarkable.  The cardiomediastinal silhouette is within normal limits.  The hemidiaphragms are unremarkable.  There are no pleural effusions.  There is no evidence of a pneumothorax.  There has been resolution of the previous airspace opacity in the right lung base.  There is a new airspace opacity in the right upper lung zone.  The osseous structures are unremarkable.                                        Assessment/Plan:     * Pneumonia due to COVID-19 virus  Presents with complaints of fever and SAM. Chest x-ray with resolution of previous infiltrate but new airpsace opacity in RUL O2 sats of 92% in ED.   Started on dexamethasone, remdesivir, lovenox  Will check procalcitonin prior to starting antibiotics  Airborne/contact/droplet isolation  Vitamin C/multivitamin/albuterol inhaler  Full code    Seizure disorder  Continue home keppra 2000mg BID  Seizure/aspiration precautions  Reports last seizure 2 weeks ago    Severe obesity (BMI >= 40)  Body mass index is 40.01 kg/m². Morbid obesity complicates all aspects of disease management from diagnostic modalities to treatment. Weight loss encouraged and health benefits explained to patient.     HTN (hypertension)  Will hold home valsartan 80mg for sepsis as BP well controlled. Reports no longer on amlodipine    Type 2 diabetes mellitus treated without insulin  Lab Results   Component Value Date    HGBA1C 6.7 (H) 03/08/2022     Will start sliding scale insulin, diabetic diet, goal -180    Sepsis due to COVID-19  See above      VTE Risk Mitigation (From admission, onward)         Ordered     enoxaparin injection 130 mg  2  times daily         05/23/22 1507              VTE: lovenox  Code: full  Diet: diabetic  Dispo: pending improvement in respiratory status  As clarification, on 5/23/2022, patient should be admitted for hospital observation services under my care in collaboration with Rosalee Hitchcock MD. Abhi Khan PA-C  Department of [facility]   [facility] - Telemetry

## 2022-05-23 NOTE — HPI
Luis Fernando Chamberlain 22 y.o. male with obseity, HTN, seizures, DM presents to the hospital with a chpresents also with chief complaint of fever.  Reports on May 4th was diagnosed with pneumonia in the emergency room and treated with Levaquin.  Reports initial improvement in symptoms, but then yesterday he began to develop severe fever and chills with associated non-productive cough. He finds his SOB is worsened with and improvexd with rest. He has not attempted any treatment at home.  He has not been hospitalized or traveling.  He denies chest pain nausea vomiting syncope, hematuria, hematemesis, melena.    In the ED, febrile tachycardic, O2 sats 92% on RA with ambulation, chest x-ray with new airspace opacity in the RUL resolution of the previous right lung base infiltrate, flu negative.

## 2022-05-23 NOTE — ASSESSMENT & PLAN NOTE
Body mass index is 40.01 kg/m². Morbid obesity complicates all aspects of disease management from diagnostic modalities to treatment. Weight loss encouraged and health benefits explained to patient.

## 2022-05-23 NOTE — ED PROVIDER NOTES
Encounter Date: 5/23/2022    SCRIBE #1 NOTE: I, Idalia Allen, am scribing for, and in the presence of,  Jr Foreman DNP. I have scribed the following portions of the note - Other sections scribed: HPI, ROS, PE.   SCRIBE #2 NOTE: I, Lavern Carson, am scribing for, and in the presence of, .     History     Chief Complaint   Patient presents with    Pneumonia     Pt was dx with pneumonia on 5/4. And given rx for levaquin. Pt had seizure on the way home after being dx. Pt followed up and was told he hadnt been given enough days of abx and gave more levaquin. Pt states he took 8 days of levaquin. Pt still had symptoms on 5/20 and had another seizure. Pt still has the cough and states the tesslon perles arent helping with the cough at all.      Luis Fernando Chamberlain is a 22 y.o. male who presents to the ED for productive cough (bloody sputum) and fever onset approximately 2 weeks ago. Pt reports that he was diagnosed with pneumonia on 5/4/22 and was prescribed Levaquin which he ran out of. 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 He mentions that he had a seizure on 5/20/22. He states that he is complaint with the medications he takes for HTN, DM, and Temporal lobe epilepsy.     The history is provided by the patient. No  was used.     Review of patient's allergies indicates:   Allergen Reactions    Amoxicillin      Past Medical History:   Diagnosis Date    Asthma     Diabetes mellitus     Hypertension     Seizures      History reviewed. No pertinent surgical history.  History reviewed. No pertinent family history.  Social History     Tobacco Use    Smoking status: Never Smoker    Smokeless tobacco: Never Used   Substance Use Topics    Alcohol use: No    Drug use: No     Review of Systems   Constitutional: Positive for fever. Negative for appetite change, chills, diaphoresis and fatigue.   HENT: Negative for congestion, ear discharge,  ear pain, postnasal drip, rhinorrhea, sinus pressure, sneezing, sore throat and voice change.    Eyes: Negative for discharge, itching and visual disturbance.   Respiratory: Positive for cough. Negative for shortness of breath and wheezing.    Cardiovascular: Negative for chest pain, palpitations and leg swelling.   Gastrointestinal: Negative for abdominal pain, nausea and vomiting.   Endocrine: Negative for polydipsia, polyphagia and polyuria.   Genitourinary: Negative for difficulty urinating, dysuria, frequency, hematuria, penile discharge, penile pain, penile swelling and urgency.   Musculoskeletal: Negative for arthralgias and myalgias.   Skin: Negative for rash and wound.   Neurological: Positive for seizures. Negative for dizziness, syncope and weakness.   Hematological: Negative for adenopathy. Does not bruise/bleed easily.   Psychiatric/Behavioral: Positive for sleep disturbance. Negative for agitation and self-injury. The patient is not nervous/anxious.        Physical Exam     Initial Vitals [05/23/22 1037]   BP Pulse Resp Temp SpO2   (!) 143/95 (!) 126 18 (!) 102.9 °F (39.4 °C) 96 %      MAP       --         Physical Exam    Nursing note and vitals reviewed.  Constitutional: He appears well-developed and well-nourished. He is not diaphoretic. No distress. He is not intubated.   He spoke in complete sentences. He is resting in high burnett's position.   HENT:   Head: Normocephalic and atraumatic.   Right Ear: External ear normal.   Left Ear: External ear normal.   Nose: Nose normal.   Eyes: Pupils are equal, round, and reactive to light. Right eye exhibits no discharge. Left eye exhibits no discharge. No scleral icterus.   Neck:   Normal range of motion.  Cardiovascular: Normal rate and regular rhythm.   No hypoxia.    Pulmonary/Chest: Effort normal and breath sounds normal. No accessory muscle usage. No apnea, no tachypnea and no bradypnea. He is not intubated. No respiratory distress. He has no decreased  breath sounds. He has no wheezes. He has no rhonchi. He has no rales.   Abdominal: He exhibits no distension.   Musculoskeletal:         General: Normal range of motion.      Cervical back: Normal range of motion.     Neurological: He is alert and oriented to person, place, and time.   Skin: Skin is dry. Capillary refill takes less than 2 seconds.         ED Course   Procedures  Labs Reviewed   SARS-COV-2 RDRP GENE - Abnormal; Notable for the following components:       Result Value    POC Rapid COVID Positive (*)     All other components within normal limits    Narrative:     This test utilizes isothermal nucleic acid amplification   technology to detect the SARS-CoV-2 RdRp nucleic acid segment.   The analytical sensitivity (limit of detection) is 125 genome   equivalents/mL.   A POSITIVE result implies infection with the SARS-CoV-2 virus;   the patient is presumed to be contagious.     A NEGATIVE result means that SARS-CoV-2 nucleic acids are not   present above the limit of detection. A NEGATIVE result should be   treated as presumptive. It does not rule out the possibility of   COVID-19 and should not be the sole basis for treatment decisions.   If COVID-19 is strongly suspected based on clinical and exposure   history, re-testing using an alternate molecular assay should be   considered.   This test is only for use under the Food and Drug   Administration s Emergency Use Authorization (EUA).   Commercial kits are provided by [employer].   Performance characteristics of the EUA have been independently   verified by [facility] Department of   Pathology and Laboratory Medicine.   _________________________________________________________________   The authorized Fact Sheet for Healthcare Providers and the authorized Fact   Sheet for Patients of the ID NOW COVID-19 are available on the FDA   website:     https://www.fda.gov/media/948780/download  https://www.fda.gov/media/107561/download           POCT URINALYSIS W/O SCOPE - Abnormal; Notable for the following components:    Blood, UA Trace-lysed (*)     Protein, UA Trace (*)     All other components within normal limits   ISTAT PROCEDURE - Abnormal; Notable for the following components:    POC PO2 68 (*)     POC SATURATED O2 93 (*)     All other components within normal limits   POCT CMP - Abnormal; Notable for the following components:    POC Glucose 132 (*)     All other components within normal limits   CULTURE, BLOOD   CULTURE, BLOOD   POCT CBC   POCT URINALYSIS W/O SCOPE   POCT INFLUENZA A/B MOLECULAR   POCT CMP   POCT RAPID INFLUENZA A/B          Imaging Results          X-Ray Chest AP Portable (Final result)  Result time 05/23/22 12:08:18    Final result by Ferny Daly MD (05/23/22 12:08:18)                 Impression:      New airspace opacity in the right upper lung zone.    Resolution of previous airspace opacity in the right lung base.      Electronically signed by: Ferny Daly MD  Date:    05/23/2022  Time:    12:08             Narrative:    EXAMINATION:  XR CHEST AP PORTABLE    CLINICAL HISTORY:  fever;    TECHNIQUE:  Single frontal view of the chest was performed.    COMPARISON:  05/02/2022.    FINDINGS:  Monitoring EKG leads are present.  The trachea is unremarkable.  The cardiomediastinal silhouette is within normal limits.  The hemidiaphragms are unremarkable.  There are no pleural effusions.  There is no evidence of a pneumothorax.  There has been resolution of the previous airspace opacity in the right lung base.  There is a new airspace opacity in the right upper lung zone.  The osseous structures are unremarkable.                                 Medications   acetaminophen tablet 1,000 mg (1,000 mg Oral Given 5/23/22 1040)   sodium chloride 0.9% bolus 1,000 mL (0 mLs Intravenous Stopped 5/23/22 1223)   dexamethasone injection 8 mg (8 mg Intravenous Given 5/23/22 1250)     Medical Decision Making:   History:   Old Medical Records: I  decided to obtain old medical records.  Initial Assessment:   Luis Fernando Chamberlain is a 22 y.o. male who presents to the ED for productive cough (bloody sputum) and fever onset approximately 2 weeks ago. Pt reports that he was diagnosed with pneumonia on 5/4/22 and was prescribed Levaquin which he ran out of. He mentions that he has not been able to sleep which triggers his Temporal lobe epilepsy(had a seizure on 5/20/22). The physical exam findings are no hypoxia, He spoke in complete sentences, and he is resting in high burnett's position. A chest x-ray, CBC, ISTAT procedure, urinalysis, rapid influenza, CMP, 2 blood cultures, and Covid-19 tests were ordered and evaluated.  Differential Diagnosis:   Included but not limited to: pneumonia, Covid-19, influenza A/B, and URI.  Clinical Tests:   Lab Tests: Ordered and Reviewed  Radiological Study: Ordered and Reviewed          Scribe Attestation:   Scribe #1: I performed the above scribed service and the documentation accurately describes the services I performed. I attest to the accuracy of the note.  Scribe #2: I performed the above scribed service and the documentation accurately describes the services I performed. I attest to the accuracy of the note.    Attending Attestation:     Physician Attestation Statement for NP/PA:       Other NP/PA Attestation Additions:    History of Present Illness: Luis Fernando Chamberlain is a 22 y.o. male who presents to the ED for productive cough (bloody sputum) and fever onset approximately 2 weeks ago.    Physical Exam: No acute distress.  Clear lung sounds bilaterally.  Oxygen 95% on room air.  He says to 92% with ambulation on room air.    (see PA note for further details)   Medical Decision Making: Patient COVID positive with exertion desaturation.  Patient has been counseled for admission and will be admitted to the hospital for further management.    12:59 PM 5/23/2022  Emmett Dubon MD                        Scribe attestation:  I, Emmett Dubon MD, personally performed the services described in this documentation.  All medical record entries made by the scribe were at my direction and in my presence.  I have reviewed the chart and agree that the record reflects my personal performance and is accurate and complete.  ED Course as of 06/09/22 1602   Mon May 23, 2022   1134 BP(!): 143/95 [VC]   1134 Temp(!): 102.9 °F (39.4 °C) [VC]   1134 Temp src: Oral [VC]   1134 Pulse(!): 126 [VC]   1134 Resp: 18 [VC]   1134 SpO2: 96 % [VC]   1143 SARS-CoV-2 RNA, Amplification, Qual(!): Positive [VC]   1143  Acceptable: Yes [VC]   1145 SARS-CoV-2 RNA, Amplification, Qual(!): Positive [VC]   1145 POCT URINALYSIS W/O SCOPE(!)  Neg for uti. [VC]   1145 ISTAT PROCEDURE(!!)  Normal venous bg. [VC]   1145 Covid risk score=3  [facility] Decision Algorithm suggests supportive care only as pt has had sx greater than 7d. [VC]   1149 Influenza B Ag: negative [VC]   1149 Inflenza A Ag: negative [VC]   1149 POCT CMP(!)  Normal cmp. [VC]   1149  Independent EKG interpretation: Sinus tachycardia rate 112 no ectopy no ST elevation MI. [VC]   1156 POCT CBC  Elevated platelet normal cbc otherwise. [VC]   1157 Pulse(!): 114 [VC]   1157 Resp(!): 23 [VC]   1157 SpO2: 95 % [VC]   1218 X-Ray Chest AP Portable  New airspace opacity in the right upper lung zone.     Resolution of previous airspace opacity in the right lung base. [VC]   1238 BP(!): 168/81 [VC]   1239 Pulse(!): 118 [VC]   1239 Resp: 15 [VC]   1239 SpO2: 95 % [VC]   1300 Pulse: 104 [VC]   1300 SpO2: 97 % [VC]      ED Course User Index  [VC] Jr Foreman DNP             Clinical Impression:   Final diagnoses:  [R00.0] Tachycardia  [U07.1] COVID-19 (Primary)  [R09.02] Hypoxia          ED Disposition Condition    Observation             See AVS for additional recommendations. Medications listed below were prescribed after reviewing the patient's allergies, medication list,  history, most recent laboratories as available.  Referrals below were provided after reviewing the patient's previous medical providers. He understands he  should return for any worsening or changes in condition.  Prior to discharge the patient was asked if he  had any additional concerns or complaints and he declined. The patient was given an opportunity to ask questions and all were answered to his satisfaction.    Medications given in the ER During this Visit:  Medications   acetaminophen tablet 1,000 mg (1,000 mg Oral Given 5/23/22 1040)   sodium chloride 0.9% bolus 1,000 mL (0 mLs Intravenous Stopped 5/23/22 1223)   dexamethasone injection 8 mg (8 mg Intravenous Given 5/23/22 1250)        Cheo Dubon MD  05/23/22 1300       Cheo Dubon MD  06/09/22 1602

## 2022-05-23 NOTE — ASSESSMENT & PLAN NOTE
Lab Results   Component Value Date    HGBA1C 6.7 (H) 03/08/2022     Will start sliding scale insulin, diabetic diet, goal -180

## 2022-05-23 NOTE — SUBJECTIVE & OBJECTIVE
Past Medical History:   Diagnosis Date    Asthma     Diabetes mellitus     Hypertension     Seizures        History reviewed. No pertinent surgical history.    Review of patient's allergies indicates:   Allergen Reactions    Amoxicillin        No current facility-administered medications on file prior to encounter.     Current Outpatient Medications on File Prior to Encounter   Medication Sig    albuterol (VENTOLIN HFA) 90 mcg/actuation inhaler Inhale 2 puffs into the lungs every 6 (six) hours as needed for Wheezing. Rescue    levETIRAcetam (KEPPRA) 1000 MG tablet Take 1 tablet (1,000 mg total) by mouth 2 (two) times daily.    levoFLOXacin (LEVAQUIN) 750 MG tablet Take 1 tablet (750 mg total) by mouth once daily.    metFORMIN (GLUCOPHAGE) 1000 MG tablet Take 1,000 mg by mouth 2 (two) times daily with meals.    [DISCONTINUED] amLODIPine (NORVASC) 2.5 MG tablet Take 2.5 mg by mouth once daily.     Family History    Mother-HTN       Tobacco Use    Smoking status: Never Smoker    Smokeless tobacco: Never Used   Substance and Sexual Activity    Alcohol use: No    Drug use: No    Sexual activity: Never     Review of Systems   Constitutional:  Positive for chills and fever.   HENT:  Negative for nosebleeds and tinnitus.    Eyes:  Negative for photophobia and visual disturbance.   Respiratory:  Positive for cough and shortness of breath. Negative for wheezing.    Cardiovascular:  Negative for chest pain, palpitations and leg swelling.   Gastrointestinal:  Negative for abdominal distention, nausea and vomiting.   Genitourinary:  Negative for dysuria, flank pain and hematuria.   Musculoskeletal:  Negative for gait problem and joint swelling.   Skin:  Negative for rash and wound.   Neurological:  Negative for seizures and syncope.   Objective:     Vital Signs (Most Recent):  Temp: 99.1 °F (37.3 °C) (05/23/22 1346)  Pulse: 93 (05/23/22 1346)  Resp: 16 (05/23/22 1346)  BP: 134/83 (05/23/22 1346)  SpO2: 98 % (05/23/22 1346) Vital  Signs (24h Range):  Temp:  [99.1 °F (37.3 °C)-102.9 °F (39.4 °C)] 99.1 °F (37.3 °C)  Pulse:  [] 93  Resp:  [15-23] 16  SpO2:  [92 %-98 %] 98 %  BP: (134-168)/(81-95) 134/83     Weight: 133.8 kg (295 lb)  Body mass index is 40.01 kg/m².    Physical Exam  Vitals and nursing note reviewed.   Constitutional:       General: He is not in acute distress.     Appearance: He is well-developed. He is not diaphoretic.   HENT:      Head: Normocephalic and atraumatic.      Right Ear: External ear normal.      Left Ear: External ear normal.   Eyes:      General:         Right eye: No discharge.         Left eye: No discharge.      Conjunctiva/sclera: Conjunctivae normal.   Neck:      Thyroid: No thyromegaly.   Cardiovascular:      Rate and Rhythm: Normal rate and regular rhythm.      Heart sounds: No murmur heard.  Pulmonary:      Effort: Pulmonary effort is normal. No respiratory distress.      Breath sounds: Normal breath sounds.      Comments: On RA speaking in full sentences  Abdominal:      General: Bowel sounds are normal. There is no distension.      Palpations: Abdomen is soft. There is no mass.      Tenderness: There is no abdominal tenderness.   Musculoskeletal:         General: No deformity.      Cervical back: Normal range of motion and neck supple.      Right lower leg: No edema.      Left lower leg: No edema.   Skin:     General: Skin is warm and dry.   Neurological:      Mental Status: He is alert and oriented to person, place, and time.      Sensory: No sensory deficit.   Psychiatric:         Mood and Affect: Mood normal.         Behavior: Behavior normal.           Significant Labs:   CBC  WBC: 8.0  H&H: 13&41  PLTs: 418    CMP  Sodium: 137  Potassium: 4.5  Chloride: 105  BUN: 9  Cr: 1.0  Glucose: 132  Alk phos: 71  Albumin: 3.9  AST/ALT: 24/18  Lactic acid 1.5  COVID positive  Flu A/B negative  Calcium: 9.6    Significant Imaging:   Imaging Results              X-Ray Chest AP Portable (Final result)   Result time 05/23/22 12:08:18      Final result by Ferny Daly MD (05/23/22 12:08:18)                   Impression:      New airspace opacity in the right upper lung zone.    Resolution of previous airspace opacity in the right lung base.      Electronically signed by: Ferny Daly MD  Date:    05/23/2022  Time:    12:08               Narrative:    EXAMINATION:  XR CHEST AP PORTABLE    CLINICAL HISTORY:  fever;    TECHNIQUE:  Single frontal view of the chest was performed.    COMPARISON:  05/02/2022.    FINDINGS:  Monitoring EKG leads are present.  The trachea is unremarkable.  The cardiomediastinal silhouette is within normal limits.  The hemidiaphragms are unremarkable.  There are no pleural effusions.  There is no evidence of a pneumothorax.  There has been resolution of the previous airspace opacity in the right lung base.  There is a new airspace opacity in the right upper lung zone.  The osseous structures are unremarkable.

## 2022-05-23 NOTE — ASSESSMENT & PLAN NOTE
Presents with complaints of fever and SAM. Chest x-ray with resolution of previous infiltrate but new airpsace opacity in RUL O2 sats of 92% in ED.   Started on dexamethasone, remdesivir, lovenox  Will check procalcitonin prior to starting antibiotics  Airborne/contact/droplet isolation  Vitamin C/multivitamin/albuterol inhaler  Full code

## 2022-05-23 NOTE — PLAN OF CARE
Problem: Adult Inpatient Plan of Care  Goal: Plan of Care Review  Outcome: Ongoing, Progressing  Flowsheets (Taken 5/23/2022 1604)  Plan of Care Reviewed With:   patient   mother  Goal: Patient-Specific Goal (Individualized)  Outcome: Ongoing, Progressing  Goal: Absence of Hospital-Acquired Illness or Injury  Outcome: Ongoing, Progressing  Intervention: Identify and Manage Fall Risk  Flowsheets (Taken 5/23/2022 1604)  Safety Promotion/Fall Prevention: Fall Risk reviewed with patient/family  Goal: Optimal Comfort and Wellbeing  Outcome: Ongoing, Not Progressing  Intervention: Provide Person-Centered Care  Flowsheets (Taken 5/23/2022 1604)  Trust Relationship/Rapport:   care explained   choices provided   thoughts/feelings acknowledged  Goal: Readiness for Transition of Care  Outcome: Ongoing, Not Progressing

## 2022-05-23 NOTE — FIRST PROVIDER EVALUATION
Emergency Department TeleTriage Encounter Note      CHIEF COMPLAINT    Chief Complaint   Patient presents with    Pneumonia     Pt was dx with pneumonia on 5/4. And given rx for levaquin. Pt had seizure on the way home after being dx. Pt followed up and was told he hadnt been given enough days of abx and gave more levaquin. Pt states he took 8 days of levaquin. Pt still had symptoms on 5/20 and had another seizure. Pt still has the cough and states the tesslon perles arent helping with the cough at all.        VITAL SIGNS   Initial Vitals [05/23/22 1037]   BP Pulse Resp Temp SpO2   (!) 143/95 (!) 126 18 (!) 102.9 °F (39.4 °C) 96 %      MAP       --            ALLERGIES    Review of patient's allergies indicates:   Allergen Reactions    Amoxicillin        PROVIDER TRIAGE NOTE  This is a teletriage evaluation of a 22 y.o. male presenting to the ED complaining of fever. Patient diagnosed with pneumonia on 5/2/22. He has been on levaquin. He reports continued fever and chills. He also still has a productive cough. He denies taking tylenol PTA.    Patient is in no distress. He is speaking in complete sentences without difficulty. He is tachycardic and febrile, with history of pneumonia will initiate sepsis workup.     Initial orders will be placed and care will be transferred to an alternate provider when patient is roomed for a full evaluation. Any additional orders and the final disposition will be determined by that provider.           ORDERS  Labs Reviewed   CULTURE, BLOOD   CULTURE, BLOOD   POCT CBC   POCT URINALYSIS W/O SCOPE   SARS-COV-2 RDRP GENE   POCT INFLUENZA A/B MOLECULAR   POCT CMP       ED Orders (720h ago, onward)    Start Ordered     Status Ordering Provider    05/23/22 1500 05/23/22 1101  POCT Venous Blood Gas Once  Once        Comments: This test should be used for VBGs.  If using this order for other tests (K, creatinine, HCT, PT/INR, lactate etc)  ONLY do so in the case of an emergency or rapid  response.Notify Physician if: see parameters below.      Acknowledged RACHEL WERNER.    05/23/22 1115 05/23/22 1101  sodium chloride 0.9% bolus 1,000 mL  ED 1 Time         Acknowledged RACHEL WERNER.    05/23/22 1102 05/23/22 1101  POCT COVID-19 Rapid Screening  Once         Acknowledged RACHEL WERNER.    05/23/22 1102 05/23/22 1101  Airborne and Contact and Droplet Isolation Status  Continuous         Acknowledged RACHEL WERNER.    05/23/22 1102 05/23/22 1101  POCT Influenza A/B Molecular  Once         Acknowledged RACHEL WERNER.    05/23/22 1100 05/23/22 1101  POCT Venous Blood Gas Once  Once        Comments: This test should be used for VBGs.  If using this order for other tests (K, creatinine, HCT, PT/INR, lactate etc)  ONLY do so in the case of an emergency or rapid response.Notify Physician if: see parameters below.      Acknowledged RACHEL WERNER.    05/23/22 1059 05/23/22 1101  ED Preference List Used to Initiate Sepsis Orders  Until discontinued         Acknowledged RACHEL WERNER.    05/23/22 1059 05/23/22 1101  Blood culture x two cultures. Draw prior to antibiotics.  Every 15 min      Comments: Aerobic and anaerobic     Order ID Start Status Ordering Provider   [id number] 05/23/22 1059 Acknowledged RACHEL WERNER.   [id number] 05/23/22 1114 Acknowledged RACHEL WERNER.       Acknowledged RACHEL WERNER.    05/23/22 1059 05/23/22 1101  POCT CBC  Once         Acknowledged RACHEL WERNER SARAH.    05/23/22 1059 05/23/22 1101  POCT CMP  Once         Acknowledged RACHEL WERNER SARAH.    05/23/22 1059 05/23/22 1101  Vital signs  Every 15 min        Comments: Every 15 minutes until SBP greater than 90 or MAP greater than 65, then every 30 minutes times one hour, then every one hour.    Acknowledged ALPHONSORACHEL NELSON.    05/23/22 1059 05/23/22 1101  Cardiac Monitoring - Adult  Continuous        Comments: Notify Physician If:    Acknowledged ALPHONSO RACHEL G.    05/23/22 1059 05/23/22 1101  Pulse Oximetry Continuous   Continuous         Acknowledged RACHEL WERNER.    05/23/22 1059 05/23/22 1101  Strict intake and output  Until discontinued         Acknowledged RACHEL WERNER.    05/23/22 1059 05/23/22 1101  POCT URINALYSIS W/O SCOPE  Once         Acknowledged RACHEL WERNER.    05/23/22 1059 05/23/22 1101  Saline lock IV  Once         Acknowledged RACHEL WERNER.    05/23/22 1059 05/23/22 1101  X-Ray Chest AP Portable  1 time imaging         Acknowledged RACHEL WERNER.    05/23/22 1059 05/23/22 1101  EKG 12-lead  Once         Acknowledged RACHEL WERNER.    05/23/22 1045 05/23/22 1039  acetaminophen tablet 1,000 mg  ED 1 Time         Last MAR action: Given - by JAY CANNON on 05/23/22 at 1040 MARLEN ALVAREZ            Virtual Visit Note: The provider triage portion of this emergency department evaluation and documentation was performed via [employer], a HIPAA-compliant telemedicine application, in concert with a tele-presenter in the room. A face to face patient evaluation with one of my colleagues will occur once the patient is placed in an emergency department room.      DISCLAIMER: This note was prepared with [employer] voice recognition transcription software. Garbled syntax, mangled pronouns, and other bizarre constructions may be attributed to that software system.

## 2022-05-24 VITALS
SYSTOLIC BLOOD PRESSURE: 144 MMHG | HEART RATE: 71 BPM | OXYGEN SATURATION: 98 % | HEIGHT: 72 IN | DIASTOLIC BLOOD PRESSURE: 77 MMHG | TEMPERATURE: 99 F | BODY MASS INDEX: 39.96 KG/M2 | RESPIRATION RATE: 20 BRPM | WEIGHT: 295 LBS

## 2022-05-24 DIAGNOSIS — U07.1 COVID-19 VIRUS DETECTED: ICD-10-CM

## 2022-05-24 LAB
ALBUMIN SERPL BCP-MCNC: 3.9 G/DL (ref 3.5–5.2)
ALP SERPL-CCNC: 67 U/L (ref 55–135)
ALT SERPL W/O P-5'-P-CCNC: 18 U/L (ref 10–44)
ANION GAP SERPL CALC-SCNC: 10 MMOL/L (ref 8–16)
AST SERPL-CCNC: 16 U/L (ref 10–40)
BASOPHILS # BLD AUTO: 0.01 K/UL (ref 0–0.2)
BASOPHILS NFR BLD: 0.1 % (ref 0–1.9)
BILIRUB SERPL-MCNC: 0.3 MG/DL (ref 0.1–1)
BUN SERPL-MCNC: 11 MG/DL (ref 6–20)
CALCIUM SERPL-MCNC: 9.6 MG/DL (ref 8.7–10.5)
CHLORIDE SERPL-SCNC: 104 MMOL/L (ref 95–110)
CO2 SERPL-SCNC: 24 MMOL/L (ref 23–29)
CREAT SERPL-MCNC: 0.8 MG/DL (ref 0.5–1.4)
DIFFERENTIAL METHOD: ABNORMAL
EOSINOPHIL # BLD AUTO: 0 K/UL (ref 0–0.5)
EOSINOPHIL NFR BLD: 0 % (ref 0–8)
ERYTHROCYTE [DISTWIDTH] IN BLOOD BY AUTOMATED COUNT: 13.8 % (ref 11.5–14.5)
EST. GFR  (AFRICAN AMERICAN): >60 ML/MIN/1.73 M^2
EST. GFR  (NON AFRICAN AMERICAN): >60 ML/MIN/1.73 M^2
GLUCOSE SERPL-MCNC: 138 MG/DL (ref 70–110)
HCT VFR BLD AUTO: 42.1 % (ref 40–54)
HGB BLD-MCNC: 13.3 G/DL (ref 14–18)
IMM GRANULOCYTES # BLD AUTO: 0.04 K/UL (ref 0–0.04)
IMM GRANULOCYTES NFR BLD AUTO: 0.4 % (ref 0–0.5)
LYMPHOCYTES # BLD AUTO: 1.2 K/UL (ref 1–4.8)
LYMPHOCYTES NFR BLD: 11.8 % (ref 18–48)
MCH RBC QN AUTO: 26.1 PG (ref 27–31)
MCHC RBC AUTO-ENTMCNC: 31.6 G/DL (ref 32–36)
MCV RBC AUTO: 83 FL (ref 82–98)
MONOCYTES # BLD AUTO: 0.5 K/UL (ref 0.3–1)
MONOCYTES NFR BLD: 4.8 % (ref 4–15)
NEUTROPHILS # BLD AUTO: 8.2 K/UL (ref 1.8–7.7)
NEUTROPHILS NFR BLD: 82.9 % (ref 38–73)
NRBC BLD-RTO: 0 /100 WBC
PLATELET # BLD AUTO: 484 K/UL (ref 150–450)
PMV BLD AUTO: 10 FL (ref 9.2–12.9)
POTASSIUM SERPL-SCNC: 4.6 MMOL/L (ref 3.5–5.1)
PROT SERPL-MCNC: 8.1 G/DL (ref 6–8.4)
RBC # BLD AUTO: 5.09 M/UL (ref 4.6–6.2)
SODIUM SERPL-SCNC: 138 MMOL/L (ref 136–145)
WBC # BLD AUTO: 9.91 K/UL (ref 3.9–12.7)

## 2022-05-24 PROCEDURE — 96372 THER/PROPH/DIAG INJ SC/IM: CPT | Performed by: PHYSICIAN ASSISTANT

## 2022-05-24 PROCEDURE — 94640 AIRWAY INHALATION TREATMENT: CPT

## 2022-05-24 PROCEDURE — G0378 HOSPITAL OBSERVATION PER HR: HCPCS

## 2022-05-24 PROCEDURE — 36415 COLL VENOUS BLD VENIPUNCTURE: CPT | Performed by: NURSE PRACTITIONER

## 2022-05-24 PROCEDURE — 25000242 PHARM REV CODE 250 ALT 637 W/ HCPCS: Performed by: PHYSICIAN ASSISTANT

## 2022-05-24 PROCEDURE — 96366 THER/PROPH/DIAG IV INF ADDON: CPT

## 2022-05-24 PROCEDURE — 63600175 PHARM REV CODE 636 W HCPCS: Performed by: PHYSICIAN ASSISTANT

## 2022-05-24 PROCEDURE — 25000003 PHARM REV CODE 250: Performed by: PHYSICIAN ASSISTANT

## 2022-05-24 PROCEDURE — 80053 COMPREHEN METABOLIC PANEL: CPT | Performed by: NURSE PRACTITIONER

## 2022-05-24 PROCEDURE — 85025 COMPLETE CBC W/AUTO DIFF WBC: CPT | Performed by: PHYSICIAN ASSISTANT

## 2022-05-24 RX ORDER — ASCORBIC ACID 500 MG
500 TABLET ORAL 2 TIMES DAILY
Qty: 60 TABLET | Refills: 0 | Status: SHIPPED | OUTPATIENT
Start: 2022-05-24 | End: 2022-06-23

## 2022-05-24 RX ORDER — LEVETIRACETAM 1000 MG/1
2000 TABLET ORAL 2 TIMES DAILY
Start: 2022-05-24

## 2022-05-24 RX ORDER — BENZONATATE 100 MG/1
100 CAPSULE ORAL 3 TIMES DAILY PRN
Qty: 20 CAPSULE | Refills: 0 | Status: SHIPPED | OUTPATIENT
Start: 2022-05-24 | End: 2022-06-03

## 2022-05-24 RX ADMIN — ALBUTEROL SULFATE 2 PUFF: 108 INHALANT RESPIRATORY (INHALATION) at 07:05

## 2022-05-24 RX ADMIN — THERA TABS 1 TABLET: TAB at 08:05

## 2022-05-24 RX ADMIN — REMDESIVIR 100 MG: 100 INJECTION, POWDER, LYOPHILIZED, FOR SOLUTION INTRAVENOUS at 10:05

## 2022-05-24 RX ADMIN — ENOXAPARIN SODIUM 135 MG: 150 INJECTION SUBCUTANEOUS at 08:05

## 2022-05-24 RX ADMIN — DEXAMETHASONE 6 MG: 2 TABLET ORAL at 08:05

## 2022-05-24 RX ADMIN — OXYCODONE HYDROCHLORIDE AND ACETAMINOPHEN 500 MG: 500 TABLET ORAL at 08:05

## 2022-05-24 RX ADMIN — LEVETIRACETAM 2000 MG: 500 TABLET, FILM COATED ORAL at 08:05

## 2022-05-24 NOTE — NURSING
Patient is discharged. Telemetry removed. IV removed, tip intact. Discharge instructions given and understood. No questions asked. Family went to pharmacy to pay co-pay for home medications. Awaiting ride

## 2022-05-24 NOTE — PLAN OF CARE
05/24/22 0852   Discharge Planning   Assessment Type Discharge Planning Brief Assessment   Resource/Environmental Concerns none   Support Systems Family members   Equipment Currently Used at Home none   Current Living Arrangements home/apartment/condo   Patient/Family Anticipates Transition to home   Patient/Family Anticipated Services at Transition none   DME Needed Upon Discharge  none   Discharge Plan A Home  (with [facility] information and instructions to follow up)

## 2022-05-24 NOTE — PLAN OF CARE
05/24/22 0953   Final Note   Assessment Type Final Discharge Note   Anticipated Discharge Disposition Home   Hospital Resources/Appts/Education Provided Appointments scheduled and added to AVS;Community resources provided   Post-Acute Status   Post-Acute Authorization Other   Other Status No Post-Acute Service Needs   Pts nurse Gladys notified that the pt can d/c from CM standpoint

## 2022-05-24 NOTE — PLAN OF CARE
Problem: Adult Inpatient Plan of Care  Goal: Plan of Care Review  Outcome: Met  Goal: Patient-Specific Goal (Individualized)  Outcome: Met  Goal: Absence of Hospital-Acquired Illness or Injury  Outcome: Met  Goal: Optimal Comfort and Wellbeing  Outcome: Met  Goal: Readiness for Transition of Care  Outcome: Met     Problem: Bariatric Environmental Safety  Goal: Safety Maintained with Care  Outcome: Met     Problem: Diabetes Comorbidity  Goal: Blood Glucose Level Within Targeted Range  Outcome: Met     Problem: Fluid Imbalance (Pneumonia)  Goal: Fluid Balance  Outcome: Met     Problem: Infection (Pneumonia)  Goal: Resolution of Infection Signs and Symptoms  Outcome: Met     Problem: Respiratory Compromise (Pneumonia)  Goal: Effective Oxygenation and Ventilation  Outcome: Met

## 2022-05-24 NOTE — NURSING
Pt vital signs WNL. Pt not complaining of shortness of breath or difficulty breathing. Educated pt on fall precautions and pt verbalized understanding. PT mother at bedside and also verbalized understanding. PT complained of left shoulder pain due to previous seizure. Informed doctor and X-ray was done.

## 2022-05-24 NOTE — DISCHARGE SUMMARY
[facility]  Discharge Summary      Patient Name: Luis Fernando Chamberlain  MRN: [id number]  Patient Class: OP- Observation  Admission Date: 5/23/2022  Hospital Length of Stay: 0 days  Discharge Date and Time:  05/24/2022 9:32 AM  Attending Physician: Shane Oquendo MD   Discharging Provider: Sherrie Bowman NP  Primary Care Provider: Tom Moore Jr, MD      HPI:   Luis Fernando Chamberlain 22 y.o. male with obseity, HTN, seizures, DM presents to the hospital with a chpresents also with chief complaint of fever.  Reports on May 4th was diagnosed with pneumonia in the emergency room and treated with Levaquin.  Reports initial improvement in symptoms, but then yesterday he began to develop severe fever and chills with associated non-productive cough. He finds his SOB is worsened with and improvexd with rest. He has not attempted any treatment at home.  He has not been hospitalized or traveling.  He denies chest pain nausea vomiting syncope, hematuria, hematemesis, melena.    In the ED, febrile tachycardic, O2 sats 92% on RA with ambulation, chest x-ray with new airspace opacity in the RUL resolution of the previous right lung base infiltrate, flu negative.       * No surgery found *      Hospital Course:   Admitted to observation for pneumonia secondary to COVID-19.  Patient had 1 reading of 92% on room air at [facility].  remdesivir and dexamethasone started on admission.  Overnight patient reports shortness of breath improved and less coughing.  Patient remains stable on room air during observation stay.  Lactic acid negative.  No leukocytosis and afebrile.  Patient hemodynamically stable for discharge home.  COVID-19 discharge instruction provided on discharge.        Goals of Care Treatment Preferences:  Code Status: Full Code      Consults:     No new Assessment & Plan notes have been filed under this hospital service since the last note was generated.  Service: Hospital Medicine    Final  Active Diagnoses:    Diagnosis Date Noted POA    PRINCIPAL PROBLEM:  Pneumonia due to COVID-19 virus [U07.1, J12.82] 05/23/2022 Unknown    Sepsis due to COVID-19 [U07.1, A41.89] 05/23/2022 Unknown    Type 2 diabetes mellitus treated without insulin [E11.9] 05/23/2022 Unknown    HTN (hypertension) [I10] 05/23/2022 Unknown    Severe obesity (BMI >= 40) [E66.01] 05/23/2022 Unknown    Seizure disorder [G40.909] 05/23/2022 Unknown      Problems Resolved During this Admission:       Discharged Condition: good    Disposition: Home or Self Care    Follow Up:   Follow-up Information     Tom Moore Jr, MD Follow up.    Specialty: Family Medicine  Contact information:  [address] [facility] [employer] [facility]  [address] [facility] [address]  [phone]                       Patient Instructions:      Diet Cardiac     Diet diabetic     Notify your health care provider if you experience any of the following:  temperature >100.4     Notify your health care provider if you experience any of the following:  difficulty breathing or increased cough     Notify your health care provider if you experience any of the following:  increased confusion or weakness     Activity as tolerated       Significant Diagnostic Studies: Labs: All labs within the past 24 hours have been reviewed    Pending Diagnostic Studies:     None         Medications:  Reconciled Home Medications:      Medication List      START taking these medications    ascorbic acid (vitamin C) 500 MG tablet  Commonly known as: VITAMIN C  Take 1 tablet (500 mg total) by mouth 2 (two) times daily.     benzonatate 100 MG capsule  Commonly known as: TESSALON  Take 1 capsule (100 mg total) by mouth 3 (three) times daily as needed for Cough.     multivitamin tablet  Commonly known as: THERAGRAN  Take 1 tablet by mouth once daily.  Start taking on: May 25, 2022        CHANGE how you take these medications    levETIRAcetam 1000 MG tablet  Commonly known as:  KEPPRA  Take 2 tablets (2,000 mg total) by mouth 2 (two) times daily.  What changed: how much to take        CONTINUE taking these medications    metFORMIN 1000 MG tablet  Commonly known as: GLUCOPHAGE  Take 1,000 mg by mouth 2 (two) times daily with meals.     valsartan 80 MG tablet  Commonly known as: DIOVAN  Take 80 mg by mouth once daily.     VENTOLIN HFA 90 mcg/actuation inhaler  Generic drug: albuterol  Inhale 2 puffs into the lungs every 6 (six) hours as needed for Wheezing. Rescue        STOP taking these medications    amLODIPine 2.5 MG tablet  Commonly known as: NORVASC     levoFLOXacin 750 MG tablet  Commonly known as: LEVAQUIN            Indwelling Lines/Drains at time of discharge:   Lines/Drains/Airways     None                 Time spent on the discharge of patient: 30 minutes         Sherrie Bowman NP  Department of Hospital Medicine  [facility] [place] [facility]

## 2022-05-24 NOTE — HOSPITAL COURSE
Admitted to observation for pneumonia secondary to COVID-19.  Patient had 1 reading of 92% on room air at [facility].  remdesivir and dexamethasone started on admission.  Overnight patient reports shortness of breath improved and less coughing.  Patient remains stable on room air during observation stay.  Lactic acid negative.  No leukocytosis and afebrile.  Patient hemodynamically stable for discharge home.  COVID-19 discharge instruction provided on discharge.

## 2022-05-24 NOTE — NURSING
Report received from off going nurse, ALESIA Hardy. Patient AAO. No signs of distress noted. Call light in reach. Bed low and locked. Will continue plan of care.

## 2022-05-27 LAB
BACTERIA BLD CULT: NORMAL
BACTERIA BLD CULT: NORMAL